# Patient Record
Sex: FEMALE | Race: WHITE | Employment: FULL TIME | URBAN - METROPOLITAN AREA
[De-identification: names, ages, dates, MRNs, and addresses within clinical notes are randomized per-mention and may not be internally consistent; named-entity substitution may affect disease eponyms.]

---

## 2018-01-07 ENCOUNTER — OFFICE VISIT (OUTPATIENT)
Dept: URGENT CARE | Facility: RETAIL CLINIC | Age: 53
End: 2018-01-07
Payer: COMMERCIAL

## 2018-01-07 VITALS
SYSTOLIC BLOOD PRESSURE: 123 MMHG | DIASTOLIC BLOOD PRESSURE: 82 MMHG | OXYGEN SATURATION: 94 % | TEMPERATURE: 98.7 F | HEART RATE: 90 BPM

## 2018-01-07 DIAGNOSIS — R05.9 COUGH: ICD-10-CM

## 2018-01-07 DIAGNOSIS — J01.90 ACUTE SINUSITIS WITH SYMPTOMS > 10 DAYS: Primary | ICD-10-CM

## 2018-01-07 DIAGNOSIS — R06.2 WHEEZING: ICD-10-CM

## 2018-01-07 PROCEDURE — 99213 OFFICE O/P EST LOW 20 MIN: CPT | Performed by: PHYSICIAN ASSISTANT

## 2018-01-07 RX ORDER — CODEINE PHOSPHATE AND GUAIFENESIN 10; 100 MG/5ML; MG/5ML
1-2 SOLUTION ORAL
Qty: 120 ML | Refills: 0 | Status: SHIPPED | OUTPATIENT
Start: 2018-01-07 | End: 2018-02-28

## 2018-01-07 RX ORDER — IPRATROPIUM BROMIDE AND ALBUTEROL SULFATE 2.5; .5 MG/3ML; MG/3ML
1 SOLUTION RESPIRATORY (INHALATION) EVERY 6 HOURS PRN
Qty: 360 ML | Refills: 0 | Status: SHIPPED | OUTPATIENT
Start: 2018-01-07

## 2018-01-07 RX ORDER — ALBUTEROL SULFATE 5 MG/ML
2.5 SOLUTION RESPIRATORY (INHALATION) EVERY 6 HOURS PRN
COMMUNITY
End: 2018-01-07

## 2018-01-07 NOTE — MR AVS SNAPSHOT
"              After Visit Summary   1/7/2018    Ivonne Jerome    MRN: [id number]           Patient Information     Date Of Birth          1965        Visit Information        Provider Department      1/7/2018 9:00 AM Marisela Arreola PA-C [facility]        Today's Diagnoses     Acute sinusitis with symptoms > 10 days    -  1    Cough        Wheezing          Care Instructions    Take antibiotic as directed  Use flonase, use albuterol inhaler, use duoneb, can take any over the counter pain reliever  Mucinex during day  Codeine cough at night  Apply warm facial compresses/packs for 5-10 minutes three times daily.  Drink plenty of fluids- 6 to 10 glasses of liquids each day. Rest.  Saline drops or nasal sprays as needed.   May use netti pot as needed. Do not use tap water. May use filtered or distilled water.  Steam treatments or humidifier.  Tylenol or ibuprofen as needed for pain or fever  Follow up at your primary care clinic for increasing pain, high fever, vision changes, worsening symptoms, or no relief from symptoms after 7-10 days.  Please follow up with primary care provider if not improving, worsening or new symptoms or for any adverse reactions to medications.               Follow-ups after your visit        Who to contact     You can reach your care team any time of the day by calling [phone].  Notification of test results:  If you have an abnormal lab result, we will notify you by phone as soon as possible.         Additional Information About Your Visit        MyChart Information     [facility] lets you send messages to your doctor, view your test results, renew your prescriptions, schedule appointments and more. To sign up, go to [email or web address] . Click on \"Log in\" on the left side of the screen, which will take you to the Welcome page. Then click on \"Sign up Now\" on the right side of the page.     You will be asked to enter the access code listed below, as " well as some personal information. Please follow the directions to create your username and password.     Your access code is: [email or web address]  Expires: 2018  9:37 AM     Your access code will  in 90 days. If you need help or a new code, please call your [facility] clinic or [phone].        Care EveryWhere ID     This is your Care EveryWhere ID. This could be used by other organizations to access your [facility] medical records  [id number]        Your Vitals Were     Pulse Temperature Pulse Oximetry             90 98.7  F (37.1  C) (Oral) 94%          Blood Pressure from Last 3 Encounters:   18 123/82   16 120/82   09/30/15 129/89    Weight from Last 3 Encounters:   No data found for Wt              Today, you had the following     No orders found for display         Today's Medication Changes          These changes are accurate as of: 18  9:37 AM.  If you have any questions, ask your nurse or doctor.               Start taking these medicines.        Dose/Directions    amoxicillin-clavulanate 875-125 MG per tablet   Commonly known as:  AUGMENTIN   Used for:  Acute sinusitis with symptoms > 10 days        Dose:  1 tablet   Take 1 tablet by mouth 2 times daily for 10 days   Quantity:  20 tablet   Refills:  0       guaiFENesin-codeine 100-10 MG/5ML Soln solution   Commonly known as:  ROBITUSSIN AC   Used for:  Cough        Dose:  1-2 tsp.   Take 5-10 mLs by mouth nightly as needed for cough   Quantity:  120 mL   Refills:  0       ipratropium - albuterol 0.5 mg/2.5 mg/3 mL 0.5-2.5 (3) MG/3ML neb solution   Commonly known as:  DUONEB   Used for:  Wheezing        Dose:  1 vial   Take 1 vial (3 mLs) by nebulization every 6 hours as needed for shortness of breath / dyspnea or wheezing   Quantity:  360 mL   Refills:  0            Where to get your medicines      These medications were sent to [facility] [address] [place] [address] [id number] [place] [address]      Phone:  [phone]     amoxicillin-clavulanate 875-125 MG per tablet    ipratropium - albuterol 0.5 mg/2.5 mg/3 mL 0.5-2.5 (3) MG/3ML neb solution         Some of these will need a paper prescription and others can be bought over the counter.  Ask your nurse if you have questions.     Bring a paper prescription for each of these medications     guaiFENesin-codeine 100-10 MG/5ML Soln solution                Primary Care Provider Office Phone # Fax #    Viarl Hernandez [phone]       [facility] [address]  [place] [address]        Equal Access to Services     [facility] BONIFACIO : Delmi Negro, aris clemens, erin starr, mavis streeter. So [facility] [phone].    ATENCIÓN: Si habla español, tiene a stoddard disposición servicios gratuitos de asistencia lingüística. Llame al [phone].    We comply with applicable federal civil rights laws and [place] laws. We do not discriminate on the basis of race, color, national origin, age, disability, sex, sexual orientation, or gender identity.            Thank you!     Thank you for choosing [facility]  for your care. Our goal is always to provide you with excellent care. Hearing back from our patients is one way we can continue to improve our services. Please take a few minutes to complete the written survey that you may receive in the mail after your visit with us. Thank you!             Your Updated Medication List - Protect others around you: Learn how to safely use, store and throw away your medicines at www.disposemymeds.org.          This list is accurate as of: 1/7/18  9:37 AM.  Always use your most recent med list.                   Brand Name Dispense Instructions for use Diagnosis    ADVIL PO           albuterol (5 MG/ML) 0.5% neb solution    PROVENTIL     Take 2.5 mg by nebulization every 6 hours as needed for wheezing or shortness of breath / dyspnea         ALBUTEROL IN           ALLEGRA PO      Take  by mouth.        amoxicillin-clavulanate 875-125 MG per tablet    AUGMENTIN    20 tablet    Take 1 tablet by mouth 2 times daily for 10 days    Acute sinusitis with symptoms > 10 days       fluticasone 50 MCG/ACT spray    FLONASE    1 Package    Spray 1-2 sprays into both nostrils daily    Right acute serous otitis media, recurrence not specified       guaiFENesin-codeine 100-10 MG/5ML Soln solution    ROBITUSSIN AC    120 mL    Take 5-10 mLs by mouth nightly as needed for cough    Cough       ipratropium - albuterol 0.5 mg/2.5 mg/3 mL 0.5-2.5 (3) MG/3ML neb solution    DUONEB    360 mL    Take 1 vial (3 mLs) by nebulization every 6 hours as needed for shortness of breath / dyspnea or wheezing    Wheezing       MUCINEX PO           SIMVASTATIN PO      Take  by mouth.

## 2018-01-07 NOTE — NURSING NOTE
Chief Complaint   Patient presents with     Sinus Problem     3-4 weeks; stuffed, packed sinuses, drainage but cannot blow anything     Cough     chest congestion; using a nebulizer due to wheezing, sore body due to cough     Plugged Ears     intermittent       Initial /82 (BP Location: Left arm)  Pulse 90  Temp 98.7  F (37.1  C) (Oral)  SpO2 94% There is no height or weight on file to calculate BMI.  Medication Reconciliation: complete

## 2018-01-07 NOTE — PATIENT INSTRUCTIONS
Take antibiotic as directed  Use flonase, use albuterol inhaler, use duoneb, can take any over the counter pain reliever  Mucinex during day  Codeine cough at night  Apply warm facial compresses/packs for 5-10 minutes three times daily.  Drink plenty of fluids- 6 to 10 glasses of liquids each day. Rest.  Saline drops or nasal sprays as needed.   May use netti pot as needed. Do not use tap water. May use filtered or distilled water.  Steam treatments or humidifier.  Tylenol or ibuprofen as needed for pain or fever  Follow up at your primary care clinic for increasing pain, high fever, vision changes, worsening symptoms, or no relief from symptoms after 7-10 days.  Please follow up with primary care provider if not improving, worsening or new symptoms or for any adverse reactions to medications.

## 2018-01-07 NOTE — PROGRESS NOTES
Chief Complaint   Patient presents with     Sinus Problem     3-4 weeks; stuffed, packed sinuses, drainage but cannot blow anything     Cough     chest congestion; using a nebulizer due to wheezing, sore body due to cough     Plugged Ears     intermittent     SUBJECTIVE:   Ivonne Jerome is a 52 year old female presenting with a chief complaint of sinus congestion, cough, tight breathing  Onset of symptoms was 3 weeks  Course of illness is worsening.    Severity moderate  Current and Associated symptoms: cough - productive, wheezing, nasal congestion, facial pain/pressure, headache, ears plugged  Treatment measures tried include duoneb - almost out, flonase, mucinex, ibuprofen  Predisposing factors include history of sinus infection,     Past Medical History:   Diagnosis Date     Mild intermittent asthma      Current Outpatient Prescriptions   Medication Sig Dispense Refill     ALBUTEROL IN        albuterol (PROVENTIL) (5 MG/ML) 0.5% neb solution Take 2.5 mg by nebulization every 6 hours as needed for wheezing or shortness of breath / dyspnea       Ibuprofen (ADVIL PO)        GuaiFENesin (MUCINEX PO)        fluticasone (FLONASE) 50 MCG/ACT nasal spray Spray 1-2 sprays into both nostrils daily 1 Package 2     SIMVASTATIN PO Take  by mouth.       Fexofenadine HCl (ALLEGRA PO) Take  by mouth.          Allergies   Allergen Reactions     Seasonal Allergies         Social History   Substance Use Topics     Smoking status: Never Smoker     Smokeless tobacco: Not on file     Alcohol use Not on file       ROS:  CONSTITUTIONAL:POSITIVE  for fatigue and NEGATIVE  for fever   ENT/MOUTH: POSITIVE for nasal congestion, sinus pressure and rhinorrhea-purulent  RESP:POSITIVE for cough-productive, wheezing    OBJECTIVE:  /82 (BP Location: Left arm)  Pulse 90  Temp 98.7  F (37.1  C) (Oral)  SpO2 94%  GENERAL APPEARANCE: mildly ill appearing  EYES: conjunctiva clear  HENT: TMs dull, serous effusion, nasal turbinates  erythematous, swollen, rhinorrhea yellow, oral mucous membranes moist, no erythema noted, maxillary sinus tenderness. Post nasal drainage noted in the pharynx.  NECK: supple, nontender, no lymphadenopathy  RESP: intermittent inspir wheezing in upper lobes, no rales or rhonchi  CV: regular rates and rhythm, normal S1 S2, no murmur noted  SKIN: no suspicious lesions or rashes    ASSESSMENT:  (J01.90) Acute sinusitis with symptoms > 10 days  (primary encounter diagnosis)  (R05) Cough  (R06.2) Wheezing    PLAN:  Plan: ipratropium - albuterol 0.5 mg/2.5 mg/3 mL  (DUONEB) neb solution  Plan: guaiFENesin-codeine (ROBITUSSIN AC) 100-10 MG/5ML SOLN solution   amoxicillin-clavulanate (AUGMENTIN) 875-125 MG per tablet  Take antibiotic as directed  Use flonase, use albuterol inhaler, use duoneb, can take any over the counter pain reliever  Mucinex during day  Codeine cough at night  Apply warm facial compresses/packs for 5-10 minutes three times daily.  Drink plenty of fluids- 6 to 10 glasses of liquids each day. Rest.  Saline drops or nasal sprays as needed.   May use netti pot as needed. Do not use tap water. May use filtered or distilled water.  Steam treatments or humidifier.  Tylenol or ibuprofen as needed for pain or fever  Follow up at your primary care clinic for increasing pain, high fever, vision changes, worsening symptoms, or no relief from symptoms after 7-10 days.  Please follow up with primary care provider if not improving, worsening or new symptoms or for any adverse reactions to medications.     Marisela Arreola PA-C  [facility]

## 2018-02-28 ENCOUNTER — OFFICE VISIT (OUTPATIENT)
Dept: URGENT CARE | Facility: RETAIL CLINIC | Age: 53
End: 2018-02-28
Payer: COMMERCIAL

## 2018-02-28 VITALS
TEMPERATURE: 98.7 F | HEART RATE: 81 BPM | DIASTOLIC BLOOD PRESSURE: 90 MMHG | SYSTOLIC BLOOD PRESSURE: 145 MMHG | OXYGEN SATURATION: 95 %

## 2018-02-28 DIAGNOSIS — R52 BODY ACHES: ICD-10-CM

## 2018-02-28 DIAGNOSIS — L73.9 FOLLICULITIS: ICD-10-CM

## 2018-02-28 DIAGNOSIS — J10.1 INFLUENZA B: Primary | ICD-10-CM

## 2018-02-28 DIAGNOSIS — J45.20 MILD INTERMITTENT ASTHMA WITHOUT COMPLICATION: ICD-10-CM

## 2018-02-28 LAB
FLUAV AG UPPER RESP QL IA.RAPID: NEGATIVE
FLUBV AG UPPER RESP QL IA.RAPID: POSITIVE

## 2018-02-28 PROCEDURE — 87804 INFLUENZA ASSAY W/OPTIC: CPT | Mod: QW | Performed by: PHYSICIAN ASSISTANT

## 2018-02-28 PROCEDURE — 99213 OFFICE O/P EST LOW 20 MIN: CPT | Performed by: PHYSICIAN ASSISTANT

## 2018-02-28 RX ORDER — OSELTAMIVIR PHOSPHATE 75 MG/1
75 CAPSULE ORAL 2 TIMES DAILY
Qty: 10 CAPSULE | Refills: 0 | Status: SHIPPED | OUTPATIENT
Start: 2018-02-28 | End: 2018-03-05

## 2018-02-28 RX ORDER — MUPIROCIN 20 MG/G
OINTMENT TOPICAL
Qty: 22 G | Refills: 1 | Status: SHIPPED | OUTPATIENT
Start: 2018-02-28

## 2018-02-28 RX ORDER — ACETAMINOPHEN 160 MG
2000 TABLET,DISINTEGRATING ORAL
COMMUNITY
Start: 2018-02-09

## 2018-02-28 RX ORDER — CITALOPRAM HYDROBROMIDE 20 MG/1
20 TABLET ORAL
COMMUNITY
Start: 2017-11-08

## 2018-02-28 NOTE — MR AVS SNAPSHOT
"              After Visit Summary   2/28/2018    Ivonne Jerome    MRN: [id number]           Patient Information     Date Of Birth          1965        Visit Information        Provider Department      2/28/2018 2:50 PM Marisela Arreola PA-C [facility]        Today's Diagnoses     Influenza B    -  1    Body aches        Folliculitis          Care Instructions    For folliculitis   Use topical antibiotic as directed  Can take oral antihistamine - allegra, zyrtec, or benadryl  Apply Heat compresses 10-15 minutes as needed  Please follow up with primary care provider if not improving, worsening or new symptoms or for any adverse reactions to medications.     For influenza   Start Tamiflu tablet (antiviral medicine) 1 tablet twice a day for 5 days with food  Remain out of work//activities until 24 HOURS AFTER FEVER/BODY ACHES have resolved.  For ear pressure - sudafed, warm compresses  For cough - steam, cough drops, use neb or inhaler as needed for wheezing.  Keep mask on when around others, even at home.  Maintain hydration by drinking small amounts of clear fluids frequently. Rest. Vaporizer.  Tylenol or ibuprofen as needed for aches and fever   Call primary care provider if symptoms worsen, high fever, severe weakness or fainting  Go to the ER if any worsening wheezing or shortness of breath develop.     Discussed importance of receiving flu shot yearly.               Follow-ups after your visit        Who to contact     You can reach your care team any time of the day by calling [phone].  Notification of test results:  If you have an abnormal lab result, we will notify you by phone as soon as possible.         Additional Information About Your Visit        MyChart Information     [facility] lets you send messages to your doctor, view your test results, renew your prescriptions, schedule appointments and more. To sign up, go to [email or web address] . Click on \"Log in\" on the " "left side of the screen, which will take you to the Welcome page. Then click on \"Sign up Now\" on the right side of the page.     You will be asked to enter the access code listed below, as well as some personal information. Please follow the directions to create your username and password.     Your access code is: [email or web address]  Expires: 2018  9:37 AM     Your access code will  in 90 days. If you need help or a new code, please call your [facility] or [phone].        Care EveryWhere ID     This is your Care EveryWhere ID. This could be used by other organizations to access your [facility] medical records  [id number]        Your Vitals Were     Pulse Temperature Pulse Oximetry             81 98.7  F (37.1  C) (Temporal) 95%          Blood Pressure from Last 3 Encounters:   18 145/90   18 123/82   16 120/82    Weight from Last 3 Encounters:   No data found for Wt              We Performed the Following     INFLUENZA A/B ANTIGEN          Today's Medication Changes          These changes are accurate as of 18  3:38 PM.  If you have any questions, ask your nurse or doctor.               Start taking these medicines.        Dose/Directions    mupirocin 2 % ointment   Commonly known as:  BACTROBAN   Used for:  Folliculitis        Apply thin layer to affected area twice daily for 5-7 days   Quantity:  22 g   Refills:  1       oseltamivir 75 MG capsule   Commonly known as:  TAMIFLU   Used for:  Influenza B        Dose:  75 mg   Take 1 capsule (75 mg) by mouth 2 times daily for 5 days   Quantity:  10 capsule   Refills:  0            Where to get your medicines      These medications were sent to [facility] [address] [place] [address]     Phone:  [phone]     mupirocin 2 % ointment    oseltamivir 75 MG capsule                Primary Care Provider Office Phone # Fax #    Viral Hernandez [phone]       [facility] [address]  [place] [address]        Equal Access to Services     SHANA VALDOVINOS : Hadii aad ku hadluis aaileen Sosandro, wasarada carlosadaha, qatanjata emeraldmaousmane starr, mavis martinezmitajosie streeter. So [facility] [phone].    ATENCIÓN: Si habla español, tiene a stoddard disposición servicios gratuitos de asistencia lingüística. [facility] [phone].    We comply with applicable federal civil rights laws and [place] laws. We do not discriminate on the basis of race, color, national origin, age, disability, sex, sexual orientation, or gender identity.            Thank you!     Thank you for choosing [facility]  for your care. Our goal is always to provide you with excellent care. Hearing back from our patients is one way we can continue to improve our services. Please take a few minutes to complete the written survey that you may receive in the mail after your visit with us. Thank you!             Your Updated Medication List - Protect others around you: Learn how to safely use, store and throw away your medicines at www.disposemymeds.org.          This list is accurate as of 2/28/18  3:38 PM.  Always use your most recent med list.                   Brand Name Dispense Instructions for use Diagnosis    ADVIL PO           ALBUTEROL IN           ALLEGRA PO      Take  by mouth.        citalopram 20 MG tablet    celeXA     Take 20 mg by mouth        fluticasone 50 MCG/ACT spray    FLONASE    1 Package    Spray 1-2 sprays into both nostrils daily    Right acute serous otitis media, recurrence not specified       ipratropium - albuterol 0.5 mg/2.5 mg/3 mL 0.5-2.5 (3) MG/3ML neb solution    DUONEB    360 mL    Take 1 vial (3 mLs) by nebulization every 6 hours as needed for shortness of breath / dyspnea or wheezing    Wheezing       MUCINEX PO           mupirocin 2 % ointment    BACTROBAN    22 g    Apply thin layer to affected area twice daily for 5-7 days    Folliculitis        oseltamivir 75 MG capsule    TAMIFLU    10 capsule    Take 1 capsule (75 mg) by mouth 2 times daily for 5 days    Influenza B       SIMVASTATIN PO      Take  by mouth.        vitamin D3 2000 UNITS Caps      Take 2,000 Units by mouth

## 2018-02-28 NOTE — PROGRESS NOTES
Chief Complaint   Patient presents with     Otalgia     left ear pain and left side of throat hurts when swallowing, extreme headaches and body aches since monday, rash on chest since monday, sinus pain today, pt not sure if feverish     Cough     wheezing since sunday      SUBJECTIVE:  Patient presents with flu-like symptoms:  Body aches and headache, extreme fatigue started 2 days ago suddenly in afternoon (<48 hrs ago), left ear plugged. Was wheezing 3 days ago.  Received her flu shot. Has asthma. Taking ibuprofen.    Was in [place] and returned 3 days ago. Rash started 2 days ago, on chest, itchy. Was swimming.    Past Medical History:   Diagnosis Date     Mild intermittent asthma      Current Outpatient Prescriptions   Medication Sig Dispense Refill     Cholecalciferol (VITAMIN D3) 2000 UNITS CAPS Take 2,000 Units by mouth       citalopram (CELEXA) 20 MG tablet Take 20 mg by mouth       GuaiFENesin (MUCINEX PO)        fluticasone (FLONASE) 50 MCG/ACT nasal spray Spray 1-2 sprays into both nostrils daily 1 Package 2     SIMVASTATIN PO Take  by mouth.       ALBUTEROL IN        ipratropium - albuterol 0.5 mg/2.5 mg/3 mL (DUONEB) 0.5-2.5 (3) MG/3ML neb solution Take 1 vial (3 mLs) by nebulization every 6 hours as needed for shortness of breath / dyspnea or wheezing (Patient not taking: Reported on 2/28/2018) 360 mL 0     Ibuprofen (ADVIL PO)        Fexofenadine HCl (ALLEGRA PO) Take  by mouth.          Allergies   Allergen Reactions     Seasonal Allergies         History   Smoking Status     Never Smoker   Smokeless Tobacco     Not on file         OBJECITVE;  /90 (BP Location: Left arm)  Pulse 81  Temp 98.7  F (37.1  C) (Temporal)  SpO2 95%  Appears moderately ill but not toxic.  EARS:  R TM gray. L TM gray with serous effusion.  THROAT AND PHARYNX:  normal.  NECK: supple; no adenopathy in the neck.  SINUSES: non tender.  CHEST:  Clear.  SKIN: papules scattered on chest along hair follicles    Influenza A  negative, influenza B positive    ASSESSMENT:  (J10.1) Influenza B  (primary encounter diagnosis)  (R52) Body aches  (L73.9) Folliculitis  (J10.1) Influenza B  (primary encounter diagnosis)  (J45.20) Mild intermittent asthma without complication    PLAN:  Plan: oseltamivir (TAMIFLU) 75 MG capsule  Plan: mupirocin (BACTROBAN) 2 % ointment    For folliculitis   Use topical antibiotic as directed  Can take oral antihistamine - allegra, zyrtec, or benadryl  Apply Heat compresses 10-15 minutes as needed  Please follow up with primary care provider if not improving, worsening or new symptoms or for any adverse reactions to medications.     For influenza   Start Tamiflu tablet (antiviral medicine) 1 tablet twice a day for 5 days with food  Remain out of work//activities until 24 HOURS AFTER FEVER/BODY ACHES have resolved.  For ear pressure - sudafed, warm compresses  For cough - steam, cough drops, use neb or inhaler as needed for wheezing.  Keep mask on when around others, even at home.  Maintain hydration by drinking small amounts of clear fluids frequently. Rest. Vaporizer.  Tylenol or ibuprofen as needed for aches and fever   Call primary care provider if symptoms worsen, high fever, severe weakness or fainting  Go to the ER if any worsening wheezing or shortness of breath develop.     Discussed importance of receiving flu shot yearly.       Marisela Arreola PA-C  [facility]

## 2018-02-28 NOTE — LETTER
February 28, 2018      Ivonne Jerome  [address] [address] [facility] [address]        To Whom It May Concern:    Ivonne Jerome was seen in our clinic for acute illness today. Please excuse from work missed due to this illness. She must be fever free and body ache free for 24 hours before returning to work.       Sincerely,        Marisela Arreola PA-C

## 2018-02-28 NOTE — PATIENT INSTRUCTIONS
For folliculitis   Use topical antibiotic as directed  Can take oral antihistamine - allegra, zyrtec, or benadryl  Apply Heat compresses 10-15 minutes as needed  Please follow up with primary care provider if not improving, worsening or new symptoms or for any adverse reactions to medications.     For influenza   Start Tamiflu tablet (antiviral medicine) 1 tablet twice a day for 5 days with food  Remain out of work//activities until 24 HOURS AFTER FEVER/BODY ACHES have resolved.  For ear pressure - sudafed, warm compresses  For cough - steam, cough drops, use neb or inhaler as needed for wheezing.  Keep mask on when around others, even at home.  Maintain hydration by drinking small amounts of clear fluids frequently. Rest. Vaporizer.  Tylenol or ibuprofen as needed for aches and fever   Call primary care provider if symptoms worsen, high fever, severe weakness or fainting  Go to the ER if any worsening wheezing or shortness of breath develop.     Discussed importance of receiving flu shot yearly.

## 2019-02-11 ENCOUNTER — OFFICE VISIT (OUTPATIENT)
Dept: URGENT CARE | Facility: RETAIL CLINIC | Age: 54
End: 2019-02-11
Payer: COMMERCIAL

## 2019-02-11 VITALS
DIASTOLIC BLOOD PRESSURE: 85 MMHG | TEMPERATURE: 98.5 F | HEART RATE: 83 BPM | OXYGEN SATURATION: 96 % | SYSTOLIC BLOOD PRESSURE: 134 MMHG

## 2019-02-11 DIAGNOSIS — Z53.9 ERRONEOUS ENCOUNTER--DISREGARD: Primary | ICD-10-CM

## 2019-02-11 RX ORDER — ALBUTEROL SULFATE 90 UG/1
2 AEROSOL, METERED RESPIRATORY (INHALATION)
COMMUNITY
Start: 2018-12-19

## 2019-02-11 RX ORDER — ATORVASTATIN CALCIUM 40 MG/1
TABLET, FILM COATED ORAL
Refills: 3 | COMMUNITY
Start: 2018-11-17

## 2019-02-11 RX ORDER — PREDNISONE 20 MG/1
TABLET ORAL
Refills: 0 | COMMUNITY
Start: 2018-10-18 | End: 2019-02-11

## 2019-02-11 NOTE — PROGRESS NOTES
Chief Complaint   Patient presents with     Cough     3-4 days; with wheezing; inhaler is giving no relief     Sinus Problem     1 week; drainage; pressure in the forehead a few days ago but has improved     Ent Problem     alot of fluid in both ears     Wheezing increased over past several days, using albuterol inhaler without relief.  Extensive rhonchii and wheezing throughout all lung fields  No charge visit  Referred to urgent care for evaluation today    Marisela Arreola PA-C  [facility]

## 2022-07-23 NOTE — NURSING NOTE
Chief Complaint   Patient presents with     Otalgia     left ear pain and left side of throat hurts when swallowing, extreme headaches and body aches since monday, rash on chest since monday, sinus pain today, pt not sure if feverish     Cough     wheezing since sunday       Initial /90 (BP Location: Left arm)  Pulse 81  Temp 98.7  F (37.1  C) (Temporal)  SpO2 95% There is no height or weight on file to calculate BMI.  Medication Reconciliation: complete     no